# Patient Record
(demographics unavailable — no encounter records)

---

## 2025-01-22 NOTE — LETTER BODY
[Dear  ___] : Dear  [unfilled], [Courtesy Letter:] : I had the pleasure of seeing your patient, [unfilled], in my office today. [Please see my note below.] : Please see my note below. [Consult Closing:] : Thank you very much for allowing me to participate in the care of this patient.  If you have any questions, please do not hesitate to contact me. [Sincerely,] : Sincerely, [FreeTextEntry3] : Luisa Rao MD Director of Robotic Education [facility] at [facility]   [email or web address] [phone]

## 2025-01-22 NOTE — ASSESSMENT
[FreeTextEntry1] : 74 year old with history of elevated PSA, prior MRI x 2, and prior biopsy x 1 in 2020 negative. Was traveling and had episode of presumed prostatitis and PSA was 30 - decreased but not back to baseline, still elevated at 14. MRI read as PIRADS 2, elevated PSAD, but upon review with Dr. Landis + lesions right TZa and R mid PZpl.  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 PSA 1/2025 decreased to 7.5.  While negative prostate biopsy significantly lowers the probability of subsequently identifying GG2+ prostate cancer, the protective effect of a negative biopsy likely subsides over time since prior biopsy. Patients with a prior negative biopsy remain at risk for undetected or subsequent development of GG2+ disease. A systematic review performed for the [facility] Prostate Cancer Early Detection guideline, has shown that 5% to 25% of patients who undergo a subsequent biopsy in the short term are diagnosed with GG2+ disease. We discussed this in context of elevated PSA and PSAD, however that there is the explanation of inflammation. Cultures have been negative since episode abroad. Took meloxicam one month. PSA has decreased from preop to 7.5. We discussed options: 1) trend PSA or 2) select MDX. Given decrease, negative biopsy, will continue to monitor.  Patient reports baseline urinary symptoms. On Flomax and daily Cialis currently and emptying well. Urination baseline as prior to prostatitis episode. Today we discussed the natural history of BPH and bladder outlet obstruction, risks of progression, risks of detrusor myopathy/areflexic bladder, bladder stones, UTI, worsening symptoms, risk of retention and other issues. All treatment options were reviewed.  This included surveillance, all medical therapeutic options, all outlet procedures including office based, TURP, bipolar TURP, button vaporization, Rezum, Aquablation, Urolift, thulium/holmium, suprapubic/retropubic simple (open, robotic) prostatectomy. At this time patient would like to continue on Flomax and daily Cialis.  With regards toe rections, doing well on daily Cialis.  Plan: - continue Flomax - continue daily tadalafil  - will plan for PSA in May - telemed to review PSA and will likely consider MRI at that time

## 2025-01-22 NOTE — HISTORY OF PRESENT ILLNESS
[FreeTextEntry1] : Name JULIAN WELDON MRN [id number]  Jluis 10 1951 Contact Number: [phone] ----------------------------------------------------------------------------------------------------------------------------------------- Date of First Visit: 24 Referring Provider/PCP: Dr. Jose F Ocasio f: [phone] -----------------------------------------------------------------------------------------------------------------------------------------  CC: elevated PSA  History of Present Illness: JULIAN WELDON is a 73 year old male who presents for evaluation of elevated PSA. Trend below. Baselines 4-5s. Patient reports had 2 prior MRIs in the past in  and . In  had prostate biopsy - TR - targeted (but unsure of PIRADS score) and biopsy was negative. Repeat MRI in  unchanged (still had lesion) no biopsy done.  Then patient was traveling abroad in 2024 - had fever, no energy, urinary frequency, dysuria - PSA 30.1 with UA nitrite neg LE + WBC 11-20, RBC 1-2. Was diagnosed with prostatitis and treated with levofloxacin x 10 days and symptoms improved. F/u PSA 24: 13.2. Then 5/3/24: 14.4.  Patient reports urination at baseline. Urinates frequently at baseline, + urgency. Nocturia 2x. Takes tamsulosin for years, now on bid which didn't change much. Good stream. Does not feel like always empties bladder completely. No straining. Reports did take time for symptoms to fully resolve after prostatitis. Reports at the end of urination had been feeling left sided back pain that lasted for a few seconds and that has mostly resolved. No hematuria, no history nephrolithiasis. No history UTI prior to this. Did report was in setting of traveling and dehydration. Partner also had prostatitis at the time but minor.  Uncle had prostate cancer later in life, treated. Sister had breast cancer in 50s - SUZE.  Takes sildenafil prn for erections. Decline as the day goes on. Issues with both attaining and maintaining but able to complete and currently satisfactory.  PSA Trend: 20: 5.8, 26% free20: 4.3 21: 4.1, 26.6% free 7/10/23: 4.6, 30.9% free 24: 30.1 (UA LE +, 11-20 WBC) 24: 13.2 5/3/24: 14.4  MRI: 2 prior - + lesion but no report or images available for review  Biopsies: TR targeted and systematic - negative in   Renal US in office given flank discomfort at end of urination - wnl.  IPSS: 11 QOL 3 ALDO: 12 PVR (to ensure adequate emptying): 39 ---------------------------------------------------------------------------------------------------------------------------------------- Interval History (2024):  Underwent prostate MR 24: Size: 5.6 x 4.3 x 5.0 [transverse x AP x CC] cm. Volume: 62.9 cc . PSA density: 0.22 ng/mL/mL Central gland: Moderate BPH. No MRI targetable lesion.  Peripheral zone: Patchy mild T2 hypointensities bilateral PZ with mild restriction and significant enhancement post gadolinium is suspicious for inflammation/prostatitis. Less likely would be nonclinically significant prostate carcinoma. No MRI targetable lesion.New as compared to prior study.  MRI Targets: None Neurovascular bundle: No evidence of neurovascular bundle invasion. Seminal vesicles: No seminal vesicle invasion. Lymph nodes: No pelvic adenopathy. Bones: No suspicious lesions identified. Urinary bladder: Trabeculated. Mild circumferential bladder urothelial enhancement. Other: Sigmoid diverticulosis.  IMPRESSION: PIRADS 2 - Low (clinically significant cancer is unlikely to be present) Suspicious for PZ inflammation/prostatitis-findings are new as compared to the prior outside MR 2021. BPH. Elevated PSA density.  Reviewed with Dr. 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 No fevers, chills, change in urination. No issues after biopsy. Not bothered by current urination.  IPSS 15 ALDO 5 Uroflow: insufficient void - 29cc, Qmax 4.2 PVR (to ensure adequate emptying): 33 ---------------------------------------------------------------------------------------------------------------------------------------- Interval History (2024): Started meloxicam and daily tadalafil, in addition to his Flomax, last visit. Completed meloxicam. With regards to symptoms reports improvement in erections and tolerating without issue. No major change in change in urination, does report nocturia varies. Some nights 2x, other times up to 4x. Does drink caffeine in afternoon and fluids before bed.  IPSS 14 ALDO 15 Uroflow:  voided 15cc, Qmax 4.2 - insufficient void ---------------------------------------------------------------------------------------------------------------------------------------- Interval History (2025):  PSA 25: 7.5, 24.4% free - PSAD based on MRI 0.12  Urination has been better over this time. Continues on Flomax and daily tadalafil. Occasional frequency, less urgency, less nocturia.  IPSS 13 QOL 2 ALDO 4  PVR (to ensure adequate emptying): 13 ---------------------------------------------------------------------------------------------------------------------------------------- PMH: HTN, gout, minor arthritis PSH: cataracts, macula repair Family History: sister breast cancer SUZE, uncle prostate Social: , no children, retired , ex-smoker 16-40 1/2-1 ppd, regular alcohol, rare marijuana Meds: chlorthalidone, valsartan, tamsulosin, latanoprost drops, sildenafil Allergies: NKDA ROS: no fevers, chills, weight loss ---------------------------------------------------------------------------------------------------------------------------------------- PSA Trend: 20: 5.8, 26% free 20: 4.3 21: 4.1, 26.6% free 7/10/23: 4.6, 30.9% free 24: 30.1 (UA LE +, 11-20 WBC) 24: 13.2 5/3/24: 14.4 25: 7.5, 24.4% free

## 2025-05-30 NOTE — HISTORY OF PRESENT ILLNESS
[FreeTextEntry1] : Name JULIAN WELDON MRN [id number]  Jluis 10 1951 Contact Number: [phone] ----------------------------------------------------------------------------------------------------------------------------------------- Date of First Visit: 24 Referring Provider/PCP: Dr. Jose F Ocasio f: [phone] -----------------------------------------------------------------------------------------------------------------------------------------  CC: elevated PSA  History of Present Illness: JULIAN WELDON is a 73 year old male who presents for evaluation of elevated PSA. Trend below. Baselines 4-5s. Patient reports had 2 prior MRIs in the past in  and . In  had prostate biopsy - TR - targeted (but unsure of PIRADS score) and biopsy was negative. Repeat MRI in  unchanged (still had lesion) no biopsy done.  Then patient was traveling abroad in 2024 - had fever, no energy, urinary frequency, dysuria - PSA 30.1 with UA nitrite neg LE + WBC 11-20, RBC 1-2. Was diagnosed with prostatitis and treated with levofloxacin x 10 days and symptoms improved. F/u PSA 24: 13.2. Then 5/3/24: 14.4.  Patient reports urination at baseline. Urinates frequently at baseline, + urgency. Nocturia 2x. Takes tamsulosin for years, now on bid which didn't change much. Good stream. Does not feel like always empties bladder completely. No straining. Reports did take time for symptoms to fully resolve after prostatitis. Reports at the end of urination had been feeling left sided back pain that lasted for a few seconds and that has mostly resolved. No hematuria, no history nephrolithiasis. No history UTI prior to this. Did report was in setting of traveling and dehydration. Partner also had prostatitis at the time but minor.  Uncle had prostate cancer later in life, treated. Sister had breast cancer in 50s - SUZE.  Takes sildenafil prn for erections. Decline as the day goes on. Issues with both attaining and maintaining but able to complete and currently satisfactory.  PSA Trend: 20: 5.8, 26% free20: 4.3 21: 4.1, 26.6% free 7/10/23: 4.6, 30.9% free 24: 30.1 (UA LE +, 11-20 WBC) 24: 13.2 5/3/24: 14.4  MRI: 2 prior - + lesion but no report or images available for review  Biopsies: TR targeted and systematic - negative in   Renal US in office given flank discomfort at end of urination - wnl.  IPSS: 11 QOL 3 ALDO: 12 PVR (to ensure adequate emptying): 39 ---------------------------------------------------------------------------------------------------------------------------------------- Interval History (2024):  Underwent prostate MR 24: Size: 5.6 x 4.3 x 5.0 [transverse x AP x CC] cm. Volume: 62.9 cc . PSA density: 0.22 ng/mL/mL Central gland: Moderate BPH. No MRI targetable lesion.  Peripheral zone: Patchy mild T2 hypointensities bilateral PZ with mild restriction and significant enhancement post gadolinium is suspicious for inflammation/prostatitis. Less likely would be nonclinically significant prostate carcinoma. No MRI targetable lesion.New as compared to prior study.  MRI Targets: None Neurovascular bundle: No evidence of neurovascular bundle invasion. Seminal vesicles: No seminal vesicle invasion. Lymph nodes: No pelvic adenopathy. Bones: No suspicious lesions identified. Urinary bladder: Trabeculated. Mild circumferential bladder urothelial enhancement. Other: Sigmoid diverticulosis.  IMPRESSION: PIRADS 2 - Low (clinically significant cancer is unlikely to be present) Suspicious for PZ inflammation/prostatitis-findings are new as compared to the prior outside MR 2021. BPH. Elevated PSA density.  Reviewed with Dr. 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 No fevers, chills, change in urination. No issues after biopsy. Not bothered by current urination.  IPSS 15 ALDO 5 Uroflow: insufficient void - 29cc, Qmax 4.2 PVR (to ensure adequate emptying): 33 ---------------------------------------------------------------------------------------------------------------------------------------- Interval History (2024): Started meloxicam and daily tadalafil, in addition to his Flomax, last visit. Completed meloxicam. With regards to symptoms reports improvement in erections and tolerating without issue. No major change in change in urination, does report nocturia varies. Some nights 2x, other times up to 4x. Does drink caffeine in afternoon and fluids before bed.  IPSS 14 ALDO 15 Uroflow:  voided 15cc, Qmax 4.2 - insufficient void ---------------------------------------------------------------------------------------------------------------------------------------- Interval History (2025):  PSA 25: 7.5, 24.4% free - PSAD based on MRI 0.12  Urination has been better over this time. Continues on Flomax and daily tadalafil. Occasional frequency, less urgency, less nocturia.  IPSS 13 QOL 2 ALDO 4 PVR (to ensure adequate emptying): 13 ---------------------------------------------------------------------------------------------------------------------------------------- Interval History (2025): This visit was provided via telehealth using real-time 2-way audio visual technology. The patient, JULIAN WELDON, was located at home, [address] [place] [address] at the time of the visit. The provider, Dr. Rao, was located at [address] [place] [address] at the time of the visit. The patient, JULIAN WELDON and Provider participated in the telehealth encounter. Verbal consent was given by the patient. Labs 25: 6.5, 30% free (PSA 0.1)  Uriantion has fine generally, but has been spraying a bit. Continues on Flomax daily and daily tadalafil. Not bothered. Overall doing well. ---------------------------------------------------------------------------------------------------------------------------------------- PMH: HTN, gout, minor arthritis PSH: cataracts, macula repair Family History: sister breast cancer SUZE, uncle prostate Social: , no children, retired , ex-smoker 16-40 1/2-1 ppd, regular alcohol, rare marijuana Meds: chlorthalidone, valsartan, tamsulosin, latanoprost drops, sildenafil Allergies: NKDA ROS: no fevers, chills, weight loss ---------------------------------------------------------------------------------------------------------------------------------------- PSA Trend: 20: 5.8, 26% free 20: 4.3 21: 4.1, 26.6% free 7/10/23: 4.6, 30.9% free 24: 30.1 (UA LE +, 11-20 WBC) 24: 13.2 5/3/24: 14.4 25: 7.5, 24.4% free 25: 6.5, 30% free

## 2025-05-30 NOTE — LETTER BODY
[FreeTextEntry3] : Luisa Rao MD Director of Robotic Education [facility] for [facility] at [facility]   [email or web address] [phone]

## 2025-05-30 NOTE — ASSESSMENT
[FreeTextEntry1] : 74 year old with history of elevated PSA, prior MRI x 2, and prior biopsy x 1 in 2020 negative. Was traveling and had episode of presumed prostatitis and PSA was 30 - decreased but not back to baseline, still elevated at 14. MRI read as PIRADS 2, elevated PSAD, but upon review with Dr. Landis + lesions right TZa and R mid PZpl.  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 PSA 1/2025 decreased to 7.5 --> now 6.5  While negative prostate biopsy significantly lowers the probability of subsequently identifying GG2+ prostate cancer, the protective effect of a negative biopsy likely subsides over time since prior biopsy. Patients with a prior negative biopsy remain at risk for undetected or subsequent development of GG2+ disease. A systematic review performed for the [employer]UA Prostate Cancer Early Detection guideline, has shown that 5% to 25% of patients who undergo a subsequent biopsy in the short term are diagnosed with GG2+ disease. We discussed this in context of elevated PSA and PSAD, however that there is the explanation of inflammation. Cultures have been negative since episode abroad. Took meloxicam one month. PSA has decreased from preop to 6.5. We discussed options: 1) trend PSA or 2) select MDX or 3) MRI given has been 1 year since prior. After discussion risks and benefits of each approach, given decrease, negative biopsy, normal PSAD, age, will continue to monitor.  Patient reports baseline urinary symptoms. On Flomax and daily Cialis currently and emptying well. Urination baseline as prior to prostatitis episode. Maybe some increased spraying. Today we discussed the natural history of BPH and bladder outlet obstruction, risks of progression, risks of detrusor myopathy/areflexic bladder, bladder stones, UTI, worsening symptoms, risk of retention and other issues. All treatment options were reviewed.  This included surveillance, all medical therapeutic options, all outlet procedures including office based, TURP, bipolar TURP, button vaporization, Rezum, Aquablation, Urolift, thulium/holmium, suprapubic/retropubic simple (open, robotic) prostatectomy. At this time patient would like to continue on Flomax and daily Cialis, but will trial double Flomax to see if optimizes symptoms.  With regards to erections, doing well on daily Cialis.  Plan: - trial double Flomax to determine impact but patient not bothered overall - continue daily tadalafil  - PSA 6 months, will reevaluate need for MRI - fu 6 months  I am the primary provider managing this condition and will be seeing the patient longitudinally.

## 2025-05-30 NOTE — LETTER BODY
[FreeTextEntry3] : Luisa Rao MD Director of Robotic Education The [facility] for [facility] at [facility]   [email or web address] [phone]

## 2025-05-30 NOTE — HISTORY OF PRESENT ILLNESS
[FreeTextEntry1] : Name JULIAN WELDON MRN [id number]  Jluis 10 1951 Contact Number: [phone] ----------------------------------------------------------------------------------------------------------------------------------------- Date of First Visit: 24 Referring Provider/PCP: Dr. Jose F Ocasio f: [phone] -----------------------------------------------------------------------------------------------------------------------------------------  CC: elevated PSA  History of Present Illness: JULIAN WELDON is a 73 year old male who presents for evaluation of elevated PSA. Trend below. Baselines 4-5s. Patient reports had 2 prior MRIs in the past in  and . In  had prostate biopsy - TR - targeted (but unsure of PIRADS score) and biopsy was negative. Repeat MRI in  unchanged (still had lesion) no biopsy done.  Then patient was traveling [place] in 2024 - had fever, no energy, urinary frequency, dysuria - PSA 30.1 with UA nitrite neg LE + WBC 11-20, RBC 1-2. Was diagnosed with prostatitis and treated with levofloxacin x 10 days and symptoms improved. F/u PSA 24: 13.2. Then 5/3/24: 14.4.  Patient reports urination at baseline. Urinates frequently at baseline, + urgency. Nocturia 2x. Takes tamsulosin for years, now on bid which didn't change much. Good stream. Does not feel like always empties bladder completely. No straining. Reports did take time for symptoms to fully resolve after prostatitis. Reports at the end of urination had been feeling left sided back pain that lasted for a few seconds and that has mostly resolved. No hematuria, no history nephrolithiasis. No history UTI prior to this. Did report was in setting of traveling and dehydration. Partner also had prostatitis at the time but minor.  Uncle had prostate cancer later in life, treated. Sister had breast cancer in 50s - SUZE.  Takes sildenafil prn for erections. Decline as the day goes on. Issues with both attaining and maintaining but able to complete and currently satisfactory.  PSA Trend: 20: 5.8, 26% free20: 4.3 21: 4.1, 26.6% free 7/10/23: 4.6, 30.9% free 24: 30.1 (UA LE +, 11-20 WBC) 24: 13.2 5/3/24: 14.4  MRI: 2 prior - + lesion but no report or images available for review  Biopsies: TR targeted and systematic - negative in   Renal US in office given flank discomfort at end of urination - wnl.  IPSS: 11 QOL 3 ALDO: 12 PVR (to ensure adequate emptying): 39 ---------------------------------------------------------------------------------------------------------------------------------------- Interval History (2024):  Underwent prostate MR 24: Size: 5.6 x 4.3 x 5.0 [transverse x AP x CC] cm. Volume: 62.9 cc . PSA density: 0.22 ng/mL/mL Central gland: Moderate BPH. No MRI targetable lesion.  Peripheral zone: Patchy mild T2 hypointensities bilateral PZ with mild restriction and significant enhancement post gadolinium is suspicious for inflammation/prostatitis. Less likely would be nonclinically significant prostate carcinoma. No MRI targetable lesion.New as compared to prior study.  MRI Targets: None Neurovascular bundle: No evidence of neurovascular bundle invasion. Seminal vesicles: No seminal vesicle invasion. Lymph nodes: No pelvic adenopathy. Bones: No suspicious lesions identified. Urinary bladder: Trabeculated. Mild circumferential bladder urothelial enhancement. Other: Sigmoid diverticulosis.  IMPRESSION: PIRADS 2 - Low (clinically significant cancer is unlikely to be present) Suspicious for PZ inflammation/prostatitis-findings are new as compared to the prior outside MR 2021. BPH. Elevated PSA density.  Reviewed with Dr. 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 No fevers, chills, change in urination. No issues after biopsy. Not bothered by current urination.  IPSS 15 ALDO 5 Uroflow: insufficient void - 29cc, Qmax 4.2 PVR (to ensure adequate emptying): 33 ---------------------------------------------------------------------------------------------------------------------------------------- Interval History (2024): Started meloxicam and daily tadalafil, in addition to his Flomax, last visit. Completed meloxicam. With regards to symptoms reports improvement in erections and tolerating without issue. No major change in change in urination, does report nocturia varies. Some nights 2x, other times up to 4x. Does drink caffeine in afternoon and fluids before bed.  IPSS 14 ALDO 15 Uroflow:  voided 15cc, Qmax 4.2 - insufficient void ---------------------------------------------------------------------------------------------------------------------------------------- Interval History (2025):  PSA 25: 7.5, 24.4% free - PSAD based on MRI 0.12  Urination has been better over this time. Continues on Flomax and daily tadalafil. Occasional frequency, less urgency, less nocturia.  IPSS 13 QOL 2 ALDO 4 PVR (to ensure adequate emptying): 13 ---------------------------------------------------------------------------------------------------------------------------------------- Interval History (2025): This visit was provided via telehealth using real-time 2-way audio visual technology. The patient, JULIAN WELDON, was located at home, [address] [place] [address] at the time of the visit. The provider, Dr. Rao, was located at [address] [place] [address] at the time of the visit. The patient, JULIAN WELDON and Provider participated in the telehealth encounter. Verbal consent was given by the patient. Labs 25: 6.5, 30% free (PSA 0.1)  Uriantion has fine generally, but has been spraying a bit. Continues on Flomax daily and daily tadalafil. Not bothered. Overall doing well. ---------------------------------------------------------------------------------------------------------------------------------------- PMH: HTN, gout, minor arthritis PSH: cataracts, macula repair Family History: sister breast cancer SUZE, uncle prostate Social: , no children, retired , ex-smoker 16-40 1/2-1 ppd, regular alcohol, rare marijuana Meds: chlorthalidone, valsartan, tamsulosin, latanoprost drops, sildenafil Allergies: NKDA ROS: no fevers, chills, weight loss ---------------------------------------------------------------------------------------------------------------------------------------- PSA Trend: 20: 5.8, 26% free 20: 4.3 21: 4.1, 26.6% free 7/10/23: 4.6, 30.9% free 24: 30.1 (UA LE +, 11-20 WBC) 24: 13.2 5/3/24: 14.4 25: 7.5, 24.4% free 25: 6.5, 30% free